# Patient Record
Sex: FEMALE | Race: WHITE | NOT HISPANIC OR LATINO | Employment: OTHER | URBAN - METROPOLITAN AREA
[De-identification: names, ages, dates, MRNs, and addresses within clinical notes are randomized per-mention and may not be internally consistent; named-entity substitution may affect disease eponyms.]

---

## 2019-04-24 ENCOUNTER — TRANSCRIBE ORDERS (OUTPATIENT)
Dept: ADMINISTRATIVE | Facility: HOSPITAL | Age: 64
End: 2019-04-24

## 2019-04-24 ENCOUNTER — APPOINTMENT (OUTPATIENT)
Dept: LAB | Facility: MEDICAL CENTER | Age: 64
End: 2019-04-24
Payer: COMMERCIAL

## 2019-04-24 DIAGNOSIS — Z79.4 TYPE 2 DIABETES MELLITUS WITHOUT COMPLICATION, WITH LONG-TERM CURRENT USE OF INSULIN (HCC): Primary | ICD-10-CM

## 2019-04-24 DIAGNOSIS — E11.9 TYPE 2 DIABETES MELLITUS WITHOUT COMPLICATION, WITH LONG-TERM CURRENT USE OF INSULIN (HCC): Primary | ICD-10-CM

## 2019-04-24 LAB
ALBUMIN SERPL BCP-MCNC: 3.8 G/DL (ref 3.5–5)
ALP SERPL-CCNC: 52 U/L (ref 46–116)
ALT SERPL W P-5'-P-CCNC: 15 U/L (ref 12–78)
ANION GAP SERPL CALCULATED.3IONS-SCNC: 6 MMOL/L (ref 4–13)
AST SERPL W P-5'-P-CCNC: 15 U/L (ref 5–45)
BILIRUB SERPL-MCNC: 0.75 MG/DL (ref 0.2–1)
BUN SERPL-MCNC: 19 MG/DL (ref 5–25)
CALCIUM SERPL-MCNC: 9 MG/DL (ref 8.3–10.1)
CHLORIDE SERPL-SCNC: 107 MMOL/L (ref 100–108)
CHOLEST SERPL-MCNC: 120 MG/DL (ref 50–200)
CO2 SERPL-SCNC: 27 MMOL/L (ref 21–32)
CREAT SERPL-MCNC: 0.74 MG/DL (ref 0.6–1.3)
EST. AVERAGE GLUCOSE BLD GHB EST-MCNC: 174 MG/DL
GFR SERPL CREATININE-BSD FRML MDRD: 86 ML/MIN/1.73SQ M
GLUCOSE P FAST SERPL-MCNC: 98 MG/DL (ref 65–99)
HBA1C MFR BLD: 7.7 % (ref 4.2–6.3)
HDLC SERPL-MCNC: 41 MG/DL (ref 40–60)
LDLC SERPL CALC-MCNC: 60 MG/DL (ref 0–100)
NONHDLC SERPL-MCNC: 79 MG/DL
POTASSIUM SERPL-SCNC: 4.8 MMOL/L (ref 3.5–5.3)
PROT SERPL-MCNC: 6.9 G/DL (ref 6.4–8.2)
SODIUM SERPL-SCNC: 140 MMOL/L (ref 136–145)
TRIGL SERPL-MCNC: 95 MG/DL

## 2019-04-24 PROCEDURE — 80053 COMPREHEN METABOLIC PANEL: CPT | Performed by: FAMILY MEDICINE

## 2019-04-24 PROCEDURE — 82570 ASSAY OF URINE CREATININE: CPT | Performed by: FAMILY MEDICINE

## 2019-04-24 PROCEDURE — 83036 HEMOGLOBIN GLYCOSYLATED A1C: CPT | Performed by: FAMILY MEDICINE

## 2019-04-24 PROCEDURE — 82043 UR ALBUMIN QUANTITATIVE: CPT | Performed by: FAMILY MEDICINE

## 2019-04-24 PROCEDURE — 36415 COLL VENOUS BLD VENIPUNCTURE: CPT | Performed by: FAMILY MEDICINE

## 2019-04-24 PROCEDURE — 80061 LIPID PANEL: CPT | Performed by: FAMILY MEDICINE

## 2019-04-25 LAB
CREAT UR-MCNC: 197 MG/DL
MICROALBUMIN UR-MCNC: 48.3 MG/L (ref 0–20)
MICROALBUMIN/CREAT 24H UR: 25 MG/G CREATININE (ref 0–30)

## 2019-07-26 ENCOUNTER — APPOINTMENT (OUTPATIENT)
Dept: LAB | Facility: MEDICAL CENTER | Age: 64
End: 2019-07-26
Payer: COMMERCIAL

## 2019-07-26 ENCOUNTER — TRANSCRIBE ORDERS (OUTPATIENT)
Dept: ADMINISTRATIVE | Facility: HOSPITAL | Age: 64
End: 2019-07-26

## 2019-07-26 DIAGNOSIS — E11.9 TYPE 2 DIABETES MELLITUS WITHOUT COMPLICATION, WITHOUT LONG-TERM CURRENT USE OF INSULIN (HCC): Primary | ICD-10-CM

## 2019-07-26 LAB
ANION GAP SERPL CALCULATED.3IONS-SCNC: 6 MMOL/L (ref 4–13)
BUN SERPL-MCNC: 24 MG/DL (ref 5–25)
CALCIUM SERPL-MCNC: 9.3 MG/DL (ref 8.3–10.1)
CHLORIDE SERPL-SCNC: 105 MMOL/L (ref 100–108)
CO2 SERPL-SCNC: 27 MMOL/L (ref 21–32)
CREAT SERPL-MCNC: 0.83 MG/DL (ref 0.6–1.3)
EST. AVERAGE GLUCOSE BLD GHB EST-MCNC: 157 MG/DL
GFR SERPL CREATININE-BSD FRML MDRD: 75 ML/MIN/1.73SQ M
GLUCOSE P FAST SERPL-MCNC: 109 MG/DL (ref 65–99)
HBA1C MFR BLD: 7.1 % (ref 4.2–6.3)
POTASSIUM SERPL-SCNC: 4.8 MMOL/L (ref 3.5–5.3)
SODIUM SERPL-SCNC: 138 MMOL/L (ref 136–145)

## 2019-07-26 PROCEDURE — 80048 BASIC METABOLIC PNL TOTAL CA: CPT | Performed by: FAMILY MEDICINE

## 2019-07-26 PROCEDURE — 83036 HEMOGLOBIN GLYCOSYLATED A1C: CPT | Performed by: FAMILY MEDICINE

## 2019-07-26 PROCEDURE — 36415 COLL VENOUS BLD VENIPUNCTURE: CPT | Performed by: FAMILY MEDICINE

## 2019-12-11 ENCOUNTER — APPOINTMENT (OUTPATIENT)
Dept: RADIOLOGY | Facility: MEDICAL CENTER | Age: 64
End: 2019-12-11
Payer: COMMERCIAL

## 2019-12-11 ENCOUNTER — TRANSCRIBE ORDERS (OUTPATIENT)
Dept: ADMINISTRATIVE | Facility: HOSPITAL | Age: 64
End: 2019-12-11

## 2019-12-11 DIAGNOSIS — M54.16 LUMBAR RADICULOPATHY: Primary | ICD-10-CM

## 2019-12-11 DIAGNOSIS — M54.16 LUMBAR RADICULOPATHY: ICD-10-CM

## 2019-12-11 PROCEDURE — 73564 X-RAY EXAM KNEE 4 OR MORE: CPT

## 2019-12-11 PROCEDURE — 72110 X-RAY EXAM L-2 SPINE 4/>VWS: CPT

## 2020-01-09 ENCOUNTER — EVALUATION (OUTPATIENT)
Dept: PHYSICAL THERAPY | Facility: MEDICAL CENTER | Age: 65
End: 2020-01-09
Payer: COMMERCIAL

## 2020-01-09 ENCOUNTER — TRANSCRIBE ORDERS (OUTPATIENT)
Dept: PHYSICAL THERAPY | Facility: MEDICAL CENTER | Age: 65
End: 2020-01-09

## 2020-01-09 DIAGNOSIS — M54.50 CHRONIC RIGHT-SIDED LOW BACK PAIN, UNSPECIFIED WHETHER SCIATICA PRESENT: ICD-10-CM

## 2020-01-09 DIAGNOSIS — G89.29 CHRONIC PAIN OF LEFT KNEE: ICD-10-CM

## 2020-01-09 DIAGNOSIS — M54.50 CHRONIC RIGHT-SIDED LOW BACK PAIN, UNSPECIFIED WHETHER SCIATICA PRESENT: Primary | ICD-10-CM

## 2020-01-09 DIAGNOSIS — G89.29 CHRONIC PAIN OF LEFT KNEE: Primary | ICD-10-CM

## 2020-01-09 DIAGNOSIS — M25.562 CHRONIC PAIN OF LEFT KNEE: ICD-10-CM

## 2020-01-09 DIAGNOSIS — G89.29 CHRONIC RIGHT-SIDED LOW BACK PAIN, UNSPECIFIED WHETHER SCIATICA PRESENT: Primary | ICD-10-CM

## 2020-01-09 DIAGNOSIS — G89.29 CHRONIC RIGHT-SIDED LOW BACK PAIN, UNSPECIFIED WHETHER SCIATICA PRESENT: ICD-10-CM

## 2020-01-09 DIAGNOSIS — M25.562 CHRONIC PAIN OF LEFT KNEE: Primary | ICD-10-CM

## 2020-01-09 PROCEDURE — 97110 THERAPEUTIC EXERCISES: CPT | Performed by: PHYSICAL THERAPIST

## 2020-01-09 PROCEDURE — 97162 PT EVAL MOD COMPLEX 30 MIN: CPT | Performed by: PHYSICAL THERAPIST

## 2020-01-09 NOTE — PROGRESS NOTES
PT Evaluation     Today's date: 2020  Patient name: Rosalinda Holley  : 1955  MRN: [id number]  Referring provider: Myla Steen, *  Dx:   Encounter Diagnosis     ICD-10-CM    1  Chronic right-sided low back pain, unspecified whether sciatica present M54 5     G89 29    2  Chronic pain of left knee M25 562     G89 29                   Assessment  Assessment details: Patient is a 60 y/o female who presents with complaints of right sided lumbar pain that can radiate down the lateral side of the LLE as well as left knee pain  No further referral appears necessary at this time based upon examination results  Patient presents with the following impairments: decreased range of motion, decreased strength and decreased ability to perform functional tasks such as ambulation  Prognosis is good given HEP compliance and PT 2x/wk tapering to 1x/wk over the next 4-6 weeks  Please contact me if you have any questions or recommendations  Thank you for the opportunity to share in Aida's care  Impairments: abnormal gait, abnormal or restricted ROM, activity intolerance, impaired physical strength, lacks appropriate home exercise program, pain with function and weight-bearing intolerance  Understanding of Dx/Px/POC: good   Prognosis: good    Goals  STG  Decrease pain by 50% in 4 weeks  Increase range of motion by 10 degrees in 4 weeks  Increase strength by half a grade in 4 weeks  LTG  Patient will be independent in hep in 4 weeks  Patient will be able to perform adls at plof by D/C  Patient will be able to perform ambulation at plof by D/C      Plan  Patient would benefit from: skilled physical therapy  Referral necessary: No  Planned modality interventions: cryotherapy and thermotherapy: hydrocollator packs  Planned therapy interventions: abdominal trunk stabilization, manual therapy, massage, neuromuscular re-education, home exercise program, functional ROM exercises, flexibility, strengthening, stretching, therapeutic activities, therapeutic exercise, patient education and balance  Frequency: 2x week  Duration in weeks: 6  Plan of Care beginning date: 2020  Plan of Care expiration date: 2020  Treatment plan discussed with: patient        Subjective Evaluation    History of Present Illness  Mechanism of injury: Patient reports just moving here about a year ago  Retired from [employer]  Patient started walking but in the last four months, she started limping  She noted some pain in her lower leg  Patient notes a trigger point in the right buttock and the pain radiates down the posterior-lateral side of right leg  Notes she typically sleeps in the recliner but she slept on her stomach in bed  This seemed to really aggravate her symptoms  Meloxicam started at end of December which has helped  Quality of life: good    Pain  Current pain ratin  At best pain rating: 3  At worst pain rating: 10  Quality: dull ache and radiating  Relieving factors: medications  Aggravating factors: walking  Progression: improved      Diagnostic Tests  X-ray: abnormal  Treatments  Previous treatment: medication  Patient Goals  Patient goals for therapy: decreased pain, return to sport/leisure activities, increased strength and increased motion  Patient goal: walk normally        Objective     Palpation   Left   Muscle spasm in the erector spinae and gluteus medius  Tenderness of the erector spinae, gluteus bibi, gluteus medius and quadratus lumborum       Active Range of Motion     Lumbar   Flexion:  Restriction level: moderate  Extension:  Restriction level: maximal  Left lateral flexion:  with pain Restriction level: moderate  Right lateral flexion:  Restriction level: moderate  Left rotation:  Restriction level: moderate  Right rotation:  Restriction level: moderate  Left Knee   Flexion: 105 degrees   Extension: 0 degrees     Right Knee   Flexion: 110 degrees   Extension: 0 degrees     Passive Range of Motion   Left Knee   Flexion: 120 degrees     Right Knee   Flexion: 120 degrees     Strength/Myotome Testing     Left Hip   Planes of Motion   Flexion: 3+  Extension: 4  Abduction: 4-  Adduction: 4    Right Hip   Planes of Motion   Flexion: 4  Extension: 4  Abduction: 4  Adduction: 4    Left Knee   Flexion: 4-  Extension: 4-    Right Knee   Normal strength    Left Ankle/Foot   Normal strength    Right Ankle/Foot   Normal strength    Additional Strength Details  Abdominals 4-/5    Ambulation     Observational Gait   Gait: antalgic   Decreased walking speed, stride length and left stance time                   Precautions h/o disc herniation, h/o of casted knee (fell on knee), arthritis, DM, gastrointestinal disease, HTN       Manual         STM        piriformis str        ITB stretch                            Exercise Diary  1/9       bike        SKTC 10" 5x ea       PPT with hold 5" 10x       TA hip add iso        TA hip abd iso        TA marches        SAQ        LAQ 5" 10x ea               Sit to stands        Side stepping                                                                                    Modalities        prn

## 2020-01-13 ENCOUNTER — OFFICE VISIT (OUTPATIENT)
Dept: PHYSICAL THERAPY | Facility: MEDICAL CENTER | Age: 65
End: 2020-01-13
Payer: COMMERCIAL

## 2020-01-13 DIAGNOSIS — G89.29 CHRONIC RIGHT-SIDED LOW BACK PAIN, UNSPECIFIED WHETHER SCIATICA PRESENT: Primary | ICD-10-CM

## 2020-01-13 DIAGNOSIS — M25.562 CHRONIC PAIN OF LEFT KNEE: ICD-10-CM

## 2020-01-13 DIAGNOSIS — M54.50 CHRONIC RIGHT-SIDED LOW BACK PAIN, UNSPECIFIED WHETHER SCIATICA PRESENT: Primary | ICD-10-CM

## 2020-01-13 DIAGNOSIS — G89.29 CHRONIC PAIN OF LEFT KNEE: ICD-10-CM

## 2020-01-13 PROCEDURE — 97112 NEUROMUSCULAR REEDUCATION: CPT | Performed by: PHYSICAL THERAPIST

## 2020-01-13 PROCEDURE — 97110 THERAPEUTIC EXERCISES: CPT | Performed by: PHYSICAL THERAPIST

## 2020-01-13 NOTE — PROGRESS NOTES
Daily Note     Today's date: 2020  Patient name: Rosalinda Holley  : 1955  MRN: [id number]  Referring provider: Myla Steen, *  Dx:   Encounter Diagnosis     ICD-10-CM    1  Chronic right-sided low back pain, unspecified whether sciatica present M54 5     G89 29    2  Chronic pain of left knee M25 562     G89 29                   Subjective: Pt without new complaint, but did notice it was easier for her to complete reciprocal stair climbing this weekend  Objective: See treatment diary below      Assessment: pt with fair tolerance to prescribed program as outlined below although pt fatiguing quickly and unable to complete exs as expected, pt requesting to hold on bike this session  Patient demonstrated fatigue post treatment, exhibited good technique with therapeutic exercises and would benefit from continued PT      Plan: Continue per plan of care  Progress treatment as tolerated          Precautions h/o disc herniation, h/o of casted knee (fell on knee), arthritis, DM, gastrointestinal disease, HTN       Manual         STM        piriformis str        ITB stretch                            Exercise Diary        bike  Too fatigued, nv      SKTC 10" 5x ea 10" 5x ea      PPT with hold 5" 10x 5" 10x2      TA hip add iso  5" 2x10      TA hip abd iso  5" 2x10 green      TA marches  x10 B      SAQ  x10 B      LAQ 5" 10x ea 5" x 10 B              Sit to stands  nv      Side stepping  nv                                                                                  Modalities        prn

## 2020-01-15 ENCOUNTER — OFFICE VISIT (OUTPATIENT)
Dept: PHYSICAL THERAPY | Facility: MEDICAL CENTER | Age: 65
End: 2020-01-15
Payer: COMMERCIAL

## 2020-01-15 DIAGNOSIS — M25.562 CHRONIC PAIN OF LEFT KNEE: ICD-10-CM

## 2020-01-15 DIAGNOSIS — G89.29 CHRONIC RIGHT-SIDED LOW BACK PAIN, UNSPECIFIED WHETHER SCIATICA PRESENT: Primary | ICD-10-CM

## 2020-01-15 DIAGNOSIS — G89.29 CHRONIC PAIN OF LEFT KNEE: ICD-10-CM

## 2020-01-15 DIAGNOSIS — M54.50 CHRONIC RIGHT-SIDED LOW BACK PAIN, UNSPECIFIED WHETHER SCIATICA PRESENT: Primary | ICD-10-CM

## 2020-01-15 PROCEDURE — 97110 THERAPEUTIC EXERCISES: CPT | Performed by: PHYSICAL THERAPIST

## 2020-01-15 PROCEDURE — 97112 NEUROMUSCULAR REEDUCATION: CPT | Performed by: PHYSICAL THERAPIST

## 2020-01-15 PROCEDURE — 97140 MANUAL THERAPY 1/> REGIONS: CPT | Performed by: PHYSICAL THERAPIST

## 2020-01-15 NOTE — PROGRESS NOTES
Daily Note     Today's date: 1/15/2020  Patient name: Mariola Baez  : 1955  MRN: [id number]  Referring provider: Zeke Han, *  Dx:   Encounter Diagnosis     ICD-10-CM    1  Chronic right-sided low back pain, unspecified whether sciatica present M54 5     G89 29    2  Chronic pain of left knee M25 562     G89 29                   Subjective: Pt offers no new complaints  Objective: See treatment diary below      Assessment: Pt with fair tolerance to TE  Patient demonstrated fatigue post treatment, exhibited good technique with therapeutic exercises and would benefit from continued PT  Patient tired at end of session but able to perform bike this session  Plan: Continue per plan of care  Progress treatment as tolerated          Precautions h/o disc herniation, h/o of casted knee (fell on knee), arthritis, DM, gastrointestinal disease, HTN       Manual    1/15     STM        piriformis str B   AK     ITB stretch B   AK                         Exercise Diary  1/9 1/13 1/15     bike  Too fatigued, nv 10 min     SKTC 10" 5x ea 10" 5x ea 10" 5x ea     PPT with hold 5" 10x 5" 10x2 5" 20x     TA hip add iso  5" 2x10 5" 20x     TA hip abd iso  5" 2x10 green 5" 20x GTB     TA marches  x10 B x10 ea     SAQ  x10 B 3" 10x     LAQ 5" 10x ea 5" x 10 B 5" 10x B             Sit to stands  nv nv     Side stepping  nv nv                                                                                 Modalities        prn

## 2020-01-22 ENCOUNTER — OFFICE VISIT (OUTPATIENT)
Dept: PHYSICAL THERAPY | Facility: MEDICAL CENTER | Age: 65
End: 2020-01-22
Payer: COMMERCIAL

## 2020-01-22 DIAGNOSIS — G89.29 CHRONIC PAIN OF LEFT KNEE: ICD-10-CM

## 2020-01-22 DIAGNOSIS — G89.29 CHRONIC RIGHT-SIDED LOW BACK PAIN, UNSPECIFIED WHETHER SCIATICA PRESENT: Primary | ICD-10-CM

## 2020-01-22 DIAGNOSIS — M54.50 CHRONIC RIGHT-SIDED LOW BACK PAIN, UNSPECIFIED WHETHER SCIATICA PRESENT: Primary | ICD-10-CM

## 2020-01-22 DIAGNOSIS — M25.562 CHRONIC PAIN OF LEFT KNEE: ICD-10-CM

## 2020-01-22 PROCEDURE — 97140 MANUAL THERAPY 1/> REGIONS: CPT | Performed by: PHYSICAL THERAPIST

## 2020-01-22 PROCEDURE — 97112 NEUROMUSCULAR REEDUCATION: CPT | Performed by: PHYSICAL THERAPIST

## 2020-01-22 PROCEDURE — 97110 THERAPEUTIC EXERCISES: CPT | Performed by: PHYSICAL THERAPIST

## 2020-01-22 NOTE — PROGRESS NOTES
Daily Note     Today's date: 2020  Patient name: Roel Chew  : 1955  MRN: [id number]  Referring provider: Carlito Cox, *  Dx:   Encounter Diagnosis     ICD-10-CM    1  Chronic right-sided low back pain, unspecified whether sciatica present M54 5     G89 29    2  Chronic pain of left knee M25 562     G89 29                   Subjective: Pt reports exercises are going well and that they are helping back and knee  Notes she is able to climb stairs step over step  Objective: See treatment diary below      Assessment: Pt with good tolerance to TE  Patient demonstrated fatigue post treatment, exhibited good technique with therapeutic exercises and would benefit from continued PT  Able to progress repetitions of exercises today  Also added sidestepping  No complaints of increased pain  Plan: Continue per plan of care  Progress treatment as tolerated          Precautions h/o disc herniation, h/o of casted knee (fell on knee), arthritis, DM, gastrointestinal disease, HTN       Manual    1/15 1/22    STM        piriformis str B   AK AK    ITB stretch B   AK AK                        Exercise Diary  1/9 1/13 1/15 1/22    bike  Too fatigued, nv 10 min 10 min    SKTC 10" 5x ea 10" 5x ea 10" 5x ea 10" 5x ea    PPT with hold 5" 10x 5" 10x2 5" 20x 5" 20x    TA hip add iso  5" 2x10 5" 20x 5" 20x    TA hip abd iso  5" 2x10 green 5" 20x GTB 5" 20x GTB    TA marches  x10 B x10 ea 15x ea    SAQ  x10 B 3" 10x 5" 20x    LAQ 5" 10x ea 5" x 10 B 5" 10x B 5" 20x ea            Sit to stands  nv nv NV    Side stepping  nv nv 5 laps                                                                                Modalities        prn

## 2020-01-24 ENCOUNTER — OFFICE VISIT (OUTPATIENT)
Dept: PHYSICAL THERAPY | Facility: MEDICAL CENTER | Age: 65
End: 2020-01-24
Payer: COMMERCIAL

## 2020-01-24 DIAGNOSIS — M25.562 CHRONIC PAIN OF LEFT KNEE: ICD-10-CM

## 2020-01-24 DIAGNOSIS — G89.29 CHRONIC PAIN OF LEFT KNEE: ICD-10-CM

## 2020-01-24 DIAGNOSIS — G89.29 CHRONIC RIGHT-SIDED LOW BACK PAIN, UNSPECIFIED WHETHER SCIATICA PRESENT: Primary | ICD-10-CM

## 2020-01-24 DIAGNOSIS — M54.50 CHRONIC RIGHT-SIDED LOW BACK PAIN, UNSPECIFIED WHETHER SCIATICA PRESENT: Primary | ICD-10-CM

## 2020-01-24 PROCEDURE — 97140 MANUAL THERAPY 1/> REGIONS: CPT | Performed by: PHYSICAL MEDICINE & REHABILITATION

## 2020-01-24 PROCEDURE — 97112 NEUROMUSCULAR REEDUCATION: CPT | Performed by: PHYSICAL MEDICINE & REHABILITATION

## 2020-01-24 PROCEDURE — 97110 THERAPEUTIC EXERCISES: CPT | Performed by: PHYSICAL MEDICINE & REHABILITATION

## 2020-01-24 NOTE — PROGRESS NOTES
Daily Note     Today's date: 2020  Patient name: Jayne Mota  : 1955  MRN: [id number]  Referring provider: Nery Min, *  Dx:   Encounter Diagnosis     ICD-10-CM    1  Chronic right-sided low back pain, unspecified whether sciatica present M54 5     G89 29    2  Chronic pain of left knee M25 562     G89 29                   Subjective: Patient offers no new complaints  Objective: See treatment diary below    Assessment: Pt with good tolerance to TE as charted  Held some TE today secondary to patient communicating that she failed to eat breakfast this morning but did take her insulin and was feeling the effects of this  Patient defers food or drink  Resume nv  Patient demonstrated fatigue post treatment, exhibited good technique with therapeutic exercises and would benefit from continued PT  Plan: Continue per plan of care  Progress treatment as tolerated          Precautions h/o disc herniation, h/o of casted knee (fell on knee), arthritis, DM, gastrointestinal disease, HTN       Manual    1/15 1/22 1/24   STM        piriformis str B   AK AK LH   ITB stretch B   AK AK [facility]                       Exercise Diary  1/9 1/13 1/15 1/22 1/24   bike  Too fatigued, nv 10 min 10 min 10'   [address] 10" 5x ea 10" 5x ea 10" 5x ea 10" 5x ea 5x10"   PPT with hold 5" 10x 5" 10x2 5" 20x 5" 20x 20x5"   TA hip add iso  5" 2x10 5" 20x 5" 20x 20x5"   TA hip abd iso  5" 2x10 green 5" 20x GTB 5" 20x GTB GTB 20x5"   TA marches  x10 B x10 ea 15x ea 15x ea   SAQ  x10 B 3" 10x 5" 20x 20x5"   LAQ 5" 10x ea 5" x 10 B 5" 10x B 5" 20x ea np           Sit to stands  nv nv NV np   Side stepping  nv nv 5 laps np                                                                               Modalities        prn

## 2020-01-27 ENCOUNTER — OFFICE VISIT (OUTPATIENT)
Dept: PHYSICAL THERAPY | Facility: MEDICAL CENTER | Age: 65
End: 2020-01-27
Payer: COMMERCIAL

## 2020-01-27 DIAGNOSIS — M54.50 CHRONIC RIGHT-SIDED LOW BACK PAIN, UNSPECIFIED WHETHER SCIATICA PRESENT: Primary | ICD-10-CM

## 2020-01-27 DIAGNOSIS — G89.29 CHRONIC RIGHT-SIDED LOW BACK PAIN, UNSPECIFIED WHETHER SCIATICA PRESENT: Primary | ICD-10-CM

## 2020-01-27 DIAGNOSIS — M25.562 CHRONIC PAIN OF LEFT KNEE: ICD-10-CM

## 2020-01-27 DIAGNOSIS — G89.29 CHRONIC PAIN OF LEFT KNEE: ICD-10-CM

## 2020-01-27 PROCEDURE — 97140 MANUAL THERAPY 1/> REGIONS: CPT | Performed by: PHYSICAL THERAPIST

## 2020-01-27 PROCEDURE — 97110 THERAPEUTIC EXERCISES: CPT | Performed by: PHYSICAL THERAPIST

## 2020-01-27 PROCEDURE — 97112 NEUROMUSCULAR REEDUCATION: CPT | Performed by: PHYSICAL THERAPIST

## 2020-01-27 NOTE — PROGRESS NOTES
Daily Note     Today's date: 2020  Patient name: Milena Michel  : 1955  MRN: [id number]  Referring provider: Connor Pressley, *  Dx:   Encounter Diagnosis     ICD-10-CM    1  Chronic right-sided low back pain, unspecified whether sciatica present M54 5     G89 29    2  Chronic pain of left knee M25 562     G89 29                   Subjective: Patient offers no new complaints  Objective: See treatment diary below    Assessment: Pt with good tolerance to exercise program  Able to resume full POC today  Patient demonstrated fatigue post treatment, exhibited good technique with therapeutic exercises and would benefit from continued PT  Plan: Continue per plan of care  Progress treatment as tolerated          Precautions h/o disc herniation, h/o of casted knee (fell on knee), arthritis, DM, gastrointestinal disease, HTN       Manual         STM        piriformis str B AK       ITB stretch B AK                           Exercise Diary         bike 6'       [address] 10" 5x ea       PPT with hold 5" 20x       TA hip add iso 5" 20x       TA hip abd iso GTB 5" 20x       TA marches 15x ea       SAQ 5" 20x 1 5#       LAQ 5" 20x ea 1 5#               Sit to stands 2x10       Side stepping 5 laps                                                                                   Modalities        prn

## 2020-01-28 ENCOUNTER — APPOINTMENT (OUTPATIENT)
Dept: LAB | Facility: MEDICAL CENTER | Age: 65
End: 2020-01-28
Payer: COMMERCIAL

## 2020-01-28 ENCOUNTER — TRANSCRIBE ORDERS (OUTPATIENT)
Dept: ADMINISTRATIVE | Facility: HOSPITAL | Age: 65
End: 2020-01-28

## 2020-01-28 DIAGNOSIS — E11.9 TYPE 2 DIABETES MELLITUS WITHOUT COMPLICATION, WITHOUT LONG-TERM CURRENT USE OF INSULIN (HCC): ICD-10-CM

## 2020-01-28 DIAGNOSIS — E78.2 MIXED HYPERLIPIDEMIA: Primary | ICD-10-CM

## 2020-01-28 LAB
CHOLEST SERPL-MCNC: 122 MG/DL (ref 50–200)
CREAT UR-MCNC: 217 MG/DL
EST. AVERAGE GLUCOSE BLD GHB EST-MCNC: 157 MG/DL
HBA1C MFR BLD: 7.1 % (ref 4.2–6.3)
HDLC SERPL-MCNC: 34 MG/DL
LDLC SERPL CALC-MCNC: 67 MG/DL (ref 0–100)
MICROALBUMIN UR-MCNC: 45.8 MG/L (ref 0–20)
MICROALBUMIN/CREAT 24H UR: 21 MG/G CREATININE (ref 0–30)
NONHDLC SERPL-MCNC: 88 MG/DL
TRIGL SERPL-MCNC: 106 MG/DL

## 2020-01-28 PROCEDURE — 82570 ASSAY OF URINE CREATININE: CPT | Performed by: FAMILY MEDICINE

## 2020-01-28 PROCEDURE — 83036 HEMOGLOBIN GLYCOSYLATED A1C: CPT | Performed by: FAMILY MEDICINE

## 2020-01-28 PROCEDURE — 82043 UR ALBUMIN QUANTITATIVE: CPT | Performed by: FAMILY MEDICINE

## 2020-01-28 PROCEDURE — 36415 COLL VENOUS BLD VENIPUNCTURE: CPT | Performed by: FAMILY MEDICINE

## 2020-01-28 PROCEDURE — 80061 LIPID PANEL: CPT | Performed by: FAMILY MEDICINE

## 2020-01-31 ENCOUNTER — OFFICE VISIT (OUTPATIENT)
Dept: PHYSICAL THERAPY | Facility: MEDICAL CENTER | Age: 65
End: 2020-01-31
Payer: COMMERCIAL

## 2020-01-31 DIAGNOSIS — M25.562 CHRONIC PAIN OF LEFT KNEE: ICD-10-CM

## 2020-01-31 DIAGNOSIS — M54.50 CHRONIC RIGHT-SIDED LOW BACK PAIN, UNSPECIFIED WHETHER SCIATICA PRESENT: Primary | ICD-10-CM

## 2020-01-31 DIAGNOSIS — G89.29 CHRONIC RIGHT-SIDED LOW BACK PAIN, UNSPECIFIED WHETHER SCIATICA PRESENT: Primary | ICD-10-CM

## 2020-01-31 DIAGNOSIS — G89.29 CHRONIC PAIN OF LEFT KNEE: ICD-10-CM

## 2020-01-31 PROCEDURE — 97112 NEUROMUSCULAR REEDUCATION: CPT | Performed by: PHYSICAL THERAPIST

## 2020-01-31 PROCEDURE — 97140 MANUAL THERAPY 1/> REGIONS: CPT | Performed by: PHYSICAL THERAPIST

## 2020-01-31 PROCEDURE — 97110 THERAPEUTIC EXERCISES: CPT | Performed by: PHYSICAL THERAPIST

## 2020-01-31 NOTE — PROGRESS NOTES
Daily Note     Today's date: 2020  Patient name: Sherrie Nash  : 1955  MRN: [id number]  Referring provider: Kyle Mak, *  Dx:   Encounter Diagnosis     ICD-10-CM    1  Chronic right-sided low back pain, unspecified whether sciatica present M54 5     G89 29    2  Chronic pain of left knee M25 562     G89 29                   Subjective: Patient reports sore today in back and knees  Objective: See treatment diary below    Assessment: Pt with good tolerance to POC  Patient demonstrated fatigue post treatment, exhibited good technique with therapeutic exercises and would benefit from continued PT  No increase in pain throughout session  Plan: Continue per plan of care  Progress treatment as tolerated          Precautions h/o disc herniation, h/o of casted knee (fell on knee), arthritis, DM, gastrointestinal disease, HTN       Manual        STM        piriformis str B AK AK      ITB stretch B AK AK                          Exercise Diary        bike 6' 7 min      SKTC 10" 5x ea 10" 5x ea      PPT with hold 5" 20x 5" 20x      TA hip add iso 5" 20x 5" 20x      TA hip abd iso GTB 5" 20x GTB 5" 20x      TA marches 15x ea 15x ea      SAQ 5" 20x 1 5# 5" 20x 1 5#      LAQ 5" 20x ea 1 5# 5" 20x ea 1 5#              Sit to stands 2x10 2x10      Side stepping 5 laps 5 laps                                                                                  Modalities        prn

## 2020-02-03 ENCOUNTER — APPOINTMENT (OUTPATIENT)
Dept: PHYSICAL THERAPY | Facility: MEDICAL CENTER | Age: 65
End: 2020-02-03
Payer: COMMERCIAL

## 2020-02-06 ENCOUNTER — EVALUATION (OUTPATIENT)
Dept: PHYSICAL THERAPY | Facility: MEDICAL CENTER | Age: 65
End: 2020-02-06
Payer: COMMERCIAL

## 2020-02-06 ENCOUNTER — TRANSCRIBE ORDERS (OUTPATIENT)
Dept: PHYSICAL THERAPY | Facility: MEDICAL CENTER | Age: 65
End: 2020-02-06

## 2020-02-06 DIAGNOSIS — G89.29 CHRONIC RIGHT-SIDED LOW BACK PAIN, UNSPECIFIED WHETHER SCIATICA PRESENT: Primary | ICD-10-CM

## 2020-02-06 DIAGNOSIS — M54.50 CHRONIC RIGHT-SIDED LOW BACK PAIN, UNSPECIFIED WHETHER SCIATICA PRESENT: Primary | ICD-10-CM

## 2020-02-06 DIAGNOSIS — G89.29 CHRONIC PAIN OF LEFT KNEE: ICD-10-CM

## 2020-02-06 DIAGNOSIS — M25.562 CHRONIC PAIN OF LEFT KNEE: ICD-10-CM

## 2020-02-06 PROCEDURE — 97112 NEUROMUSCULAR REEDUCATION: CPT | Performed by: PHYSICAL THERAPIST

## 2020-02-06 PROCEDURE — 97110 THERAPEUTIC EXERCISES: CPT | Performed by: PHYSICAL THERAPIST

## 2020-02-06 NOTE — PROGRESS NOTES
PT Re-Evaluation     Today's date: 2020  Patient name: Valentina Lugo  : 1955  MRN: [id number]  Referring provider: Brit Schwarz, *  Dx:   Encounter Diagnosis     ICD-10-CM    1  Chronic right-sided low back pain, unspecified whether sciatica present M54 5     G89 29    2  Chronic pain of left knee M25 562     G89 29                   Assessment  Assessment details: Patient has made progress towards short and long term goals since beginning physical therapy  Patient has decreased pain, increased range of motion and increased strength  Patient also able to perform functional activities such as ambulation better than initially  Patient will  benefit from continued physical therapy for last two visits in order to provide advanced HEP and maximize function  Impairments: abnormal gait, abnormal or restricted ROM, activity intolerance, impaired physical strength, pain with function and weight-bearing intolerance  Understanding of Dx/Px/POC: good   Prognosis: good    Goals  STG  Decrease pain by 50% in 4 weeks  met  Increase range of motion by 10 degrees in 4 weeks  PM  Increase strength by half a grade in 4 weeks  PM  LTG  Patient will be independent in hep in 4 weeks  met  Patient will be able to perform adls at plof by D/C  PM  Patient will be able to perform ambulation at plof by D/C   PM    Plan  Patient would benefit from: skilled physical therapy  Referral necessary: No  Planned modality interventions: cryotherapy and thermotherapy: hydrocollator packs  Planned therapy interventions: abdominal trunk stabilization, manual therapy, massage, neuromuscular re-education, home exercise program, functional ROM exercises, flexibility, strengthening, stretching, therapeutic activities, therapeutic exercise, patient education and balance  Frequency: 2x week  Duration in weeks: 2  Plan of Care beginning date: 2020  Plan of Care expiration date: 2020  Treatment plan discussed with: patient        Subjective Evaluation    History of Present Illness  Mechanism of injury: Patient presents for reassessment after beginning PT on 2020  Patient reports it is much easier to go up and down the stairs now  Patient also reports being able to walk better as well  She is not limping as much and less pain  Reports she is taking much less pain medicine as well  Quality of life: good    Pain  Current pain ratin  At best pain ratin  At worst pain ratin  Quality: dull ache  Relieving factors: medications  Aggravating factors: walking  Progression: improved      Diagnostic Tests  X-ray: abnormal  Treatments  Previous treatment: medication  Patient Goals  Patient goals for therapy: decreased pain, return to sport/leisure activities, increased strength and increased motion  Patient goal: walk normally        Objective     Palpation   Left   Tenderness of the gluteus bibi and gluteus medius       Additional Palpation Details  Slight tenderness now    Active Range of Motion     Lumbar   Flexion:  Restriction level: minimal  Extension:  Restriction level: moderate  Left lateral flexion:  Restriction level: moderate  Right lateral flexion:  Restriction level: moderate  Left rotation:  Restriction level: minimal  Right rotation:  Restriction level: minimal  Left Knee   Flexion: 110 degrees   Extension: 0 degrees     Right Knee   Flexion: 110 degrees   Extension: 0 degrees     Passive Range of Motion   Left Knee   Flexion: 125 degrees     Right Knee   Flexion: 125 degrees     Strength/Myotome Testing     Left Hip   Planes of Motion   Flexion: 3+  Extension: 4+  Abduction: 4  Adduction: 4+    Right Hip   Planes of Motion   Flexion: 4  Extension: 4+  Abduction: 4+  Adduction: 4+    Left Knee   Flexion: 4  Extension: 4+    Right Knee   Normal strength    Left Ankle/Foot   Normal strength    Right Ankle/Foot   Normal strength    Additional Strength Details  Abdominals 4-/5    Ambulation Observational Gait   Gait: antalgic   Decreased walking speed, stride length and left stance time                   Precautions h/o disc herniation, h/o of casted knee (fell on knee), arthritis, DM, gastrointestinal disease, HTN       Manual  1/27 1/31      STM        piriformis str B AK AK      ITB stretch B AK AK                          Exercise Diary  1/27 1/31 2/6     bike 6' 7 min 7 min     SKTC 10" 5x ea 10" 5x ea 10" 5x ea     PPT with hold 5" 20x 5" 20x 5" 20x     TA hip add iso 5" 20x 5" 20x 5" 20x     TA hip abd iso GTB 5" 20x GTB 5" 20x GTB 5" 20x     TA marches 15x ea 15x ea 15x ea     SAQ 5" 20x 1 5# 5" 20x 1 5# 5" 20x 2#     LAQ 5" 20x ea 1 5# 5" 20x ea 1 5# 5" 20x ea 2#             Sit to stands 2x10 2x10 2x10     Side stepping 5 laps 5 laps 5 laps YTB                                                                                 Modalities        prn

## 2020-02-06 NOTE — LETTER
2020    Ward Taveras DO  [address] [address] [id number]    Patient: Jillian Jackson   YOB: 1955   Date of Visit: 2020     Encounter Diagnosis     ICD-10-CM    1  Chronic right-sided low back pain, unspecified whether sciatica present M54 5     G89 29    2  Chronic pain of left knee M25 562     G89 29        Dear Dr Mayr Hester: Thank you for your recent referral of Jillian Jackson  Please review the attached evaluation summary from Carol's recent visit  Please verify that you agree with the plan of care by signing the attached order  If you have any questions or concerns, please do not hesitate to call  I sincerely appreciate the opportunity to share in the care of one of your patients and hope to have another opportunity to work with you in the near future  Sincerely,    Anaya Islas, PT      Referring Provider:      I certify that I have read the below Plan of Care and certify the need for these services furnished under this plan of treatment while under my care  Ward Taveras DO [address]: [phone]          PT Re-Evaluation     Today's date: 2020  Patient name: Jillian Jackson  : 1955  MRN: [id number]  Referring provider: Jabari Silver, *  Dx:   Encounter Diagnosis     ICD-10-CM    1  Chronic right-sided low back pain, unspecified whether sciatica present M54 5     G89 29    2  Chronic pain of left knee M25 562     G89 29                   Assessment  Assessment details: Patient has made progress towards short and long term goals since beginning physical therapy  Patient has decreased pain, increased range of motion and increased strength  Patient also able to perform functional activities such as ambulation better than initially   Patient will  benefit from continued physical therapy for last two visits in order to provide advanced HEP and maximize function  Impairments: abnormal gait, abnormal or restricted ROM, activity intolerance, impaired physical strength, pain with function and weight-bearing intolerance  Understanding of Dx/Px/POC: good   Prognosis: good    Goals  STG  Decrease pain by 50% in 4 weeks  met  Increase range of motion by 10 degrees in 4 weeks  PM  Increase strength by half a grade in 4 weeks  PM  LTG  Patient will be independent in hep in 4 weeks  met  Patient will be able to perform adls at plof by D/C  PM  Patient will be able to perform ambulation at plof by D/C  PM    Plan  Patient would benefit from: skilled physical therapy  Referral necessary: No  Planned modality interventions: cryotherapy and thermotherapy: hydrocollator packs  Planned therapy interventions: abdominal trunk stabilization, manual therapy, massage, neuromuscular re-education, home exercise program, functional ROM exercises, flexibility, strengthening, stretching, therapeutic activities, therapeutic exercise, patient education and balance  Frequency: 2x week  Duration in weeks: 2  Plan of Care beginning date: 2020  Plan of Care expiration date: 2020  Treatment plan discussed with: patient        Subjective Evaluation    History of Present Illness  Mechanism of injury: Patient presents for reassessment after beginning PT on 2020  Patient reports it is much easier to go up and down the stairs now  Patient also reports being able to walk better as well  She is not limping as much and less pain  Reports she is taking much less pain medicine as well    Quality of life: good    Pain  Current pain ratin  At best pain ratin  At worst pain ratin  Quality: dull ache  Relieving factors: medications  Aggravating factors: walking  Progression: improved      Diagnostic Tests  X-ray: abnormal  Treatments  Previous treatment: medication  Patient Goals  Patient goals for therapy: decreased pain, return to sport/leisure activities, increased strength and increased motion  Patient goal: walk normally        Objective     Palpation   Left   Tenderness of the gluteus bibi and gluteus medius  Additional Palpation Details  Slight tenderness now    Active Range of Motion     Lumbar   Flexion:  Restriction level: minimal  Extension:  Restriction level: moderate  Left lateral flexion:  Restriction level: moderate  Right lateral flexion:  Restriction level: moderate  Left rotation:  Restriction level: minimal  Right rotation:  Restriction level: minimal  Left Knee   Flexion: 110 degrees   Extension: 0 degrees     Right Knee   Flexion: 110 degrees   Extension: 0 degrees     Passive Range of Motion   Left Knee   Flexion: 125 degrees     Right Knee   Flexion: 125 degrees     Strength/Myotome Testing     Left Hip   Planes of Motion   Flexion: 3+  Extension: 4+  Abduction: 4  Adduction: 4+    Right Hip   Planes of Motion   Flexion: 4  Extension: 4+  Abduction: 4+  Adduction: 4+    Left Knee   Flexion: 4  Extension: 4+    Right Knee   Normal strength    Left Ankle/Foot   Normal strength    Right Ankle/Foot   Normal strength    Additional Strength Details  Abdominals 4-/5    Ambulation     Observational Gait   Gait: antalgic   Decreased walking speed, stride length and left stance time                   Precautions h/o disc herniation, h/o of casted knee (fell on knee), arthritis, DM, gastrointestinal disease, HTN       Manual  1/27 1/31      STM        piriformis str B AK AK      ITB stretch B AK AK                          Exercise Diary  1/27 1/31 2/6     bike 6' 7 min 7 min     SKTC 10" 5x ea 10" 5x ea 10" 5x ea     PPT with hold 5" 20x 5" 20x 5" 20x     TA hip add iso 5" 20x 5" 20x 5" 20x     TA hip abd iso GTB 5" 20x GTB 5" 20x GTB 5" 20x     TA marches 15x ea 15x ea 15x ea     SAQ 5" 20x 1 5# 5" 20x 1 5# 5" 20x 2#     LAQ 5" 20x ea 1 5# 5" 20x ea 1 5# 5" 20x ea 2#             Sit to stands 2x10 2x10 2x10     Side stepping 5 laps 5 laps 5 laps YTB                                                                                 Modalities        prn

## 2020-02-10 ENCOUNTER — OFFICE VISIT (OUTPATIENT)
Dept: PHYSICAL THERAPY | Facility: MEDICAL CENTER | Age: 65
End: 2020-02-10
Payer: COMMERCIAL

## 2020-02-10 DIAGNOSIS — G89.29 CHRONIC RIGHT-SIDED LOW BACK PAIN, UNSPECIFIED WHETHER SCIATICA PRESENT: Primary | ICD-10-CM

## 2020-02-10 DIAGNOSIS — M54.50 CHRONIC RIGHT-SIDED LOW BACK PAIN, UNSPECIFIED WHETHER SCIATICA PRESENT: Primary | ICD-10-CM

## 2020-02-10 DIAGNOSIS — G89.29 CHRONIC PAIN OF LEFT KNEE: ICD-10-CM

## 2020-02-10 DIAGNOSIS — M25.562 CHRONIC PAIN OF LEFT KNEE: ICD-10-CM

## 2020-02-10 PROCEDURE — 97112 NEUROMUSCULAR REEDUCATION: CPT | Performed by: PHYSICAL THERAPIST

## 2020-02-10 PROCEDURE — 97110 THERAPEUTIC EXERCISES: CPT | Performed by: PHYSICAL THERAPIST

## 2020-02-13 ENCOUNTER — APPOINTMENT (OUTPATIENT)
Dept: PHYSICAL THERAPY | Facility: MEDICAL CENTER | Age: 65
End: 2020-02-13
Payer: COMMERCIAL

## 2020-02-14 ENCOUNTER — OFFICE VISIT (OUTPATIENT)
Dept: PHYSICAL THERAPY | Facility: MEDICAL CENTER | Age: 65
End: 2020-02-14
Payer: COMMERCIAL

## 2020-02-14 DIAGNOSIS — M25.562 CHRONIC PAIN OF LEFT KNEE: ICD-10-CM

## 2020-02-14 DIAGNOSIS — M54.50 CHRONIC RIGHT-SIDED LOW BACK PAIN, UNSPECIFIED WHETHER SCIATICA PRESENT: Primary | ICD-10-CM

## 2020-02-14 DIAGNOSIS — G89.29 CHRONIC PAIN OF LEFT KNEE: ICD-10-CM

## 2020-02-14 DIAGNOSIS — G89.29 CHRONIC RIGHT-SIDED LOW BACK PAIN, UNSPECIFIED WHETHER SCIATICA PRESENT: Primary | ICD-10-CM

## 2020-02-14 PROCEDURE — 97110 THERAPEUTIC EXERCISES: CPT | Performed by: PHYSICAL THERAPIST

## 2020-02-14 PROCEDURE — 97112 NEUROMUSCULAR REEDUCATION: CPT | Performed by: PHYSICAL THERAPIST

## 2020-02-14 NOTE — PROGRESS NOTES
Daily Note and Discharge    Today's date: 2020  Patient name: Charla Gutierrez  : 1955  MRN: [id number]  Referring provider: Ирина Mcclure, *  Dx:   Encounter Diagnosis     ICD-10-CM    1  Chronic right-sided low back pain, unspecified whether sciatica present M54 5     G89 29    2  Chronic pain of left knee M25 562     G89 29                   Subjective: Patient reports today will be last visit  Feels confident in her ability to continue with HEP on her own  Objective: See treatment diary below      Assessment: Tolerated treatment well  Patient exhibited good technique with therapeutic exercises  D/C to comprehensive HEP  Plan: D/C       Precautions h/o disc herniation, h/o of casted knee (fell on knee), arthritis, DM, gastrointestinal disease, HTN       Manual        STM        piriformis str B AK AK      ITB stretch B AK AK                          Exercise Diary  1/27 1/31 2/6 2/10 2/14   bike 6' 7 min 7 min 7 min 7 min   SKTC 10" 5x ea 10" 5x ea 10" 5x ea 10" 5x ea 10" 5x ea   PPT with hold 5" 20x 5" 20x 5" 20x 5" 20x 5" 20x   TA hip add iso 5" 20x 5" 20x 5" 20x 5" 20x 5" 20x   TA hip abd iso GTB 5" 20x GTB 5" 20x GTB 5" 20x GTB 5" 20x GTB 5"20x   TA marches 15x ea 15x ea 15x ea 20x ea 20x ea   SAQ 5" 20x 1 5# 5" 20x 1 5# 5" 20x 2# 5" 20x 2# 5" 20x 2#   LAQ 5" 20x ea 1 5# 5" 20x ea 1 5# 5" 20x ea 2# 5" 20x ea 2# 5" 20x ea 2#           Sit to stands 2x10 2x10 2x10 2x10 2x10   Side stepping 5 laps 5 laps 5 laps YTB 5 laps ytb 5 laps YTB                                                                               Modalities        prn

## 2020-08-01 ENCOUNTER — APPOINTMENT (OUTPATIENT)
Dept: LAB | Facility: MEDICAL CENTER | Age: 65
End: 2020-08-01
Payer: COMMERCIAL

## 2020-08-01 ENCOUNTER — TRANSCRIBE ORDERS (OUTPATIENT)
Dept: ADMINISTRATIVE | Facility: HOSPITAL | Age: 65
End: 2020-08-01

## 2020-08-01 DIAGNOSIS — E11.9 DIABETES MELLITUS WITHOUT COMPLICATION (HCC): ICD-10-CM

## 2020-08-01 DIAGNOSIS — Z79.4 ENCOUNTER FOR LONG-TERM (CURRENT) USE OF INSULIN (HCC): ICD-10-CM

## 2020-08-01 DIAGNOSIS — E11.9 DIABETES MELLITUS WITHOUT COMPLICATION (HCC): Primary | ICD-10-CM

## 2020-08-01 PROCEDURE — 80048 BASIC METABOLIC PNL TOTAL CA: CPT

## 2020-08-01 PROCEDURE — 83036 HEMOGLOBIN GLYCOSYLATED A1C: CPT

## 2020-08-01 PROCEDURE — 36415 COLL VENOUS BLD VENIPUNCTURE: CPT

## 2020-08-02 LAB
ANION GAP SERPL CALCULATED.3IONS-SCNC: 4 MMOL/L (ref 4–13)
BUN SERPL-MCNC: 26 MG/DL (ref 5–25)
CALCIUM SERPL-MCNC: 10 MG/DL (ref 8.3–10.1)
CHLORIDE SERPL-SCNC: 104 MMOL/L (ref 100–108)
CO2 SERPL-SCNC: 28 MMOL/L (ref 21–32)
CREAT SERPL-MCNC: 0.86 MG/DL (ref 0.6–1.3)
EST. AVERAGE GLUCOSE BLD GHB EST-MCNC: 154 MG/DL
GFR SERPL CREATININE-BSD FRML MDRD: 72 ML/MIN/1.73SQ M
GLUCOSE P FAST SERPL-MCNC: 86 MG/DL (ref 65–99)
HBA1C MFR BLD: 7 %
POTASSIUM SERPL-SCNC: 4.6 MMOL/L (ref 3.5–5.3)
SODIUM SERPL-SCNC: 136 MMOL/L (ref 136–145)

## 2021-02-05 NOTE — LETTER
2020    [facility] [address]     Patient: New Portillo   YOB: 1955   Date of Visit: 2020     Encounter Diagnosis     ICD-10-CM    1  Chronic right-sided low back pain, unspecified whether sciatica present M54 5     G89 29    2  Chronic pain of left knee M25 562     G89 29        Dear Dr Key Letters: Thank you for your recent referral of [place] Portillo  Please review the attached evaluation summary from Carol's recent visit  Please verify that you agree with the plan of care by signing the attached order  If you have any questions or concerns, please do not hesitate to call  I sincerely appreciate the opportunity to share in the care of one of your patients and hope to have another opportunity to work with you in the near future  Sincerely,    Laura Lanier, PT      Referring Provider:      I certify that I have read the below Plan of Care and certify the need for these services furnished under this plan of treatment while under my care  Corona Caban DO  [facility] [address] [address]: [phone]          PT Evaluation     Today's date: 2020  Patient name: New Portillo  : 1955  MRN: [id number]  Referring provider: Geri Blanco, *  Dx:   Encounter Diagnosis     ICD-10-CM    1  Chronic right-sided low back pain, unspecified whether sciatica present M54 5     G89 29    2  Chronic pain of left knee M25 562     G89 29                   Assessment  Assessment details: Patient is a 58 y/o female who presents with complaints of right sided lumbar pain that can radiate down the lateral side of the LLE as well as left knee pain  No further referral appears necessary at this time based upon examination results    Patient presents with the following impairments: decreased range of motion, decreased strength and decreased ability to perform functional tasks such as ambulation  Prognosis is good given HEP compliance and PT 2x/wk tapering to 1x/wk over the next 4-6 weeks  Please contact me if you have any questions or recommendations  Thank you for the opportunity to share in Aida's care  Impairments: abnormal gait, abnormal or restricted ROM, activity intolerance, impaired physical strength, lacks appropriate home exercise program, pain with function and weight-bearing intolerance  Understanding of Dx/Px/POC: good   Prognosis: good    Goals  STG  Decrease pain by 50% in 4 weeks  Increase range of motion by 10 degrees in 4 weeks  Increase strength by half a grade in 4 weeks  LTG  Patient will be independent in hep in 4 weeks  Patient will be able to perform adls at plof by D/C  Patient will be able to perform ambulation at plof by D/C  Plan  Patient would benefit from: skilled physical therapy  Referral necessary: No  Planned modality interventions: cryotherapy and thermotherapy: hydrocollator packs  Planned therapy interventions: abdominal trunk stabilization, manual therapy, massage, neuromuscular re-education, home exercise program, functional ROM exercises, flexibility, strengthening, stretching, therapeutic activities, therapeutic exercise, patient education and balance  Frequency: 2x week  Duration in weeks: 6  Plan of Care beginning date: 1/9/2020  Plan of Care expiration date: 2/20/2020  Treatment plan discussed with: patient        Subjective Evaluation    History of Present Illness  Mechanism of injury: Patient reports just moving here about a year ago  Retired from [email or web address]  Patient started walking but in the last four months, she started limping  She noted some pain in her lower leg  Patient notes a trigger point in the right buttock and the pain radiates down the posterior-lateral side of right leg  Notes she typically sleeps in the recliner but she slept on her stomach in bed   This seemed to really aggravate her symptoms  Meloxicam started at end of December which has helped  Quality of life: good    Pain  Current pain ratin  At best pain rating: 3  At worst pain rating: 10  Quality: dull ache and radiating  Relieving factors: medications  Aggravating factors: walking  Progression: improved      Diagnostic Tests  X-ray: abnormal  Treatments  Previous treatment: medication  Patient Goals  Patient goals for therapy: decreased pain, return to sport/leisure activities, increased strength and increased motion  Patient goal: walk normally        Objective     Palpation   Left   Muscle spasm in the erector spinae and gluteus medius  Tenderness of the erector spinae, gluteus bibi, gluteus medius and quadratus lumborum  Active Range of Motion     Lumbar   Flexion:  Restriction level: moderate  Extension:  Restriction level: maximal  Left lateral flexion:  with pain Restriction level: moderate  Right lateral flexion:  Restriction level: moderate  Left rotation:  Restriction level: moderate  Right rotation:  Restriction level: moderate  Left Knee   Flexion: 105 degrees   Extension: 0 degrees     Right Knee   Flexion: 110 degrees   Extension: 0 degrees     Passive Range of Motion   Left Knee   Flexion: 120 degrees     Right Knee   Flexion: 120 degrees     Strength/Myotome Testing     Left Hip   Planes of Motion   Flexion: 3+  Extension: 4  Abduction: 4-  Adduction: 4    Right Hip   Planes of Motion   Flexion: 4  Extension: 4  Abduction: 4  Adduction: 4    Left Knee   Flexion: 4-  Extension: 4-    Right Knee   Normal strength    Left Ankle/Foot   Normal strength    Right Ankle/Foot   Normal strength    Additional Strength Details  Abdominals 4-/5    Ambulation     Observational Gait   Gait: antalgic   Decreased walking speed, stride length and left stance time                   Precautions h/o disc herniation, h/o of casted knee (fell on knee), arthritis, DM, gastrointestinal disease, HTN       Manual STM        piriformis str        ITB stretch                            Exercise Diary  1/9       bike        SKTC 10" 5x ea       PPT with hold 5" 10x       TA hip add iso        TA hip abd iso        TA marches        SAQ        LAQ 5" 10x ea               Sit to stands        Side stepping                                                                                    Modalities        prn 05-Feb-2021 23:23

## 2021-09-28 ENCOUNTER — APPOINTMENT (OUTPATIENT)
Dept: LAB | Facility: MEDICAL CENTER | Age: 66
End: 2021-09-28
Payer: COMMERCIAL

## 2022-03-08 ENCOUNTER — APPOINTMENT (OUTPATIENT)
Dept: LAB | Facility: MEDICAL CENTER | Age: 67
End: 2022-03-08
Payer: COMMERCIAL

## 2022-03-08 DIAGNOSIS — E11.9 TYPE 2 DIABETES MELLITUS WITHOUT COMPLICATION, WITH LONG-TERM CURRENT USE OF INSULIN (HCC): ICD-10-CM

## 2022-03-08 DIAGNOSIS — Z79.4 TYPE 2 DIABETES MELLITUS WITHOUT COMPLICATION, WITH LONG-TERM CURRENT USE OF INSULIN (HCC): ICD-10-CM

## 2022-03-08 LAB
ANION GAP SERPL CALCULATED.3IONS-SCNC: 5 MMOL/L (ref 4–13)
BUN SERPL-MCNC: 22 MG/DL (ref 5–25)
CALCIUM SERPL-MCNC: 10 MG/DL (ref 8.3–10.1)
CHLORIDE SERPL-SCNC: 106 MMOL/L (ref 100–108)
CHOLEST SERPL-MCNC: 115 MG/DL
CO2 SERPL-SCNC: 26 MMOL/L (ref 21–32)
CREAT SERPL-MCNC: 0.97 MG/DL (ref 0.6–1.3)
EST. AVERAGE GLUCOSE BLD GHB EST-MCNC: 137 MG/DL
GFR SERPL CREATININE-BSD FRML MDRD: 61 ML/MIN/1.73SQ M
GLUCOSE P FAST SERPL-MCNC: 94 MG/DL (ref 65–99)
HBA1C MFR BLD: 6.4 %
HDLC SERPL-MCNC: 46 MG/DL
LDLC SERPL CALC-MCNC: 55 MG/DL (ref 0–100)
NONHDLC SERPL-MCNC: 69 MG/DL
POTASSIUM SERPL-SCNC: 4.5 MMOL/L (ref 3.5–5.3)
SODIUM SERPL-SCNC: 137 MMOL/L (ref 136–145)
TRIGL SERPL-MCNC: 69 MG/DL

## 2022-03-08 PROCEDURE — 80061 LIPID PANEL: CPT

## 2022-03-08 PROCEDURE — 36415 COLL VENOUS BLD VENIPUNCTURE: CPT

## 2022-03-08 PROCEDURE — 80048 BASIC METABOLIC PNL TOTAL CA: CPT

## 2022-03-08 PROCEDURE — 83036 HEMOGLOBIN GLYCOSYLATED A1C: CPT

## 2022-09-12 ENCOUNTER — APPOINTMENT (OUTPATIENT)
Dept: LAB | Facility: MEDICAL CENTER | Age: 67
End: 2022-09-12
Payer: COMMERCIAL

## 2022-09-12 DIAGNOSIS — Z79.4 TYPE 2 DIABETES MELLITUS WITHOUT COMPLICATION, WITH LONG-TERM CURRENT USE OF INSULIN (HCC): ICD-10-CM

## 2022-09-12 DIAGNOSIS — Z79.4 ENCOUNTER FOR LONG-TERM (CURRENT) USE OF INSULIN (HCC): ICD-10-CM

## 2022-09-12 DIAGNOSIS — E11.9 TYPE 2 DIABETES MELLITUS WITHOUT COMPLICATION, WITH LONG-TERM CURRENT USE OF INSULIN (HCC): ICD-10-CM

## 2022-09-12 LAB
ANION GAP SERPL CALCULATED.3IONS-SCNC: 3 MMOL/L (ref 4–13)
BUN SERPL-MCNC: 22 MG/DL (ref 5–25)
CALCIUM SERPL-MCNC: 9.3 MG/DL (ref 8.3–10.1)
CHLORIDE SERPL-SCNC: 109 MMOL/L (ref 96–108)
CO2 SERPL-SCNC: 25 MMOL/L (ref 21–32)
CREAT SERPL-MCNC: 0.99 MG/DL (ref 0.6–1.3)
EST. AVERAGE GLUCOSE BLD GHB EST-MCNC: 140 MG/DL
GFR SERPL CREATININE-BSD FRML MDRD: 59 ML/MIN/1.73SQ M
GLUCOSE P FAST SERPL-MCNC: 84 MG/DL (ref 65–99)
HBA1C MFR BLD: 6.5 %
POTASSIUM SERPL-SCNC: 4.6 MMOL/L (ref 3.5–5.3)
SODIUM SERPL-SCNC: 137 MMOL/L (ref 135–147)

## 2022-09-12 PROCEDURE — 83036 HEMOGLOBIN GLYCOSYLATED A1C: CPT

## 2022-09-12 PROCEDURE — 36415 COLL VENOUS BLD VENIPUNCTURE: CPT

## 2022-09-12 PROCEDURE — 80048 BASIC METABOLIC PNL TOTAL CA: CPT

## 2022-09-30 ENCOUNTER — APPOINTMENT (OUTPATIENT)
Dept: LAB | Facility: MEDICAL CENTER | Age: 67
End: 2022-09-30
Payer: COMMERCIAL

## 2022-09-30 DIAGNOSIS — R53.82 CHRONIC FATIGUE: ICD-10-CM

## 2022-09-30 LAB
BASOPHILS # BLD AUTO: 0.09 THOUSANDS/ΜL (ref 0–0.1)
BASOPHILS NFR BLD AUTO: 1 % (ref 0–1)
EOSINOPHIL # BLD AUTO: 0.56 THOUSAND/ΜL (ref 0–0.61)
EOSINOPHIL NFR BLD AUTO: 5 % (ref 0–6)
ERYTHROCYTE [DISTWIDTH] IN BLOOD BY AUTOMATED COUNT: 13.2 % (ref 11.6–15.1)
HCT VFR BLD AUTO: 39.7 % (ref 34.8–46.1)
HGB BLD-MCNC: 12.6 G/DL (ref 11.5–15.4)
IMM GRANULOCYTES # BLD AUTO: 0.05 THOUSAND/UL (ref 0–0.2)
IMM GRANULOCYTES NFR BLD AUTO: 0 % (ref 0–2)
LYMPHOCYTES # BLD AUTO: 3.05 THOUSANDS/ΜL (ref 0.6–4.47)
LYMPHOCYTES NFR BLD AUTO: 27 % (ref 14–44)
MCH RBC QN AUTO: 30.4 PG (ref 26.8–34.3)
MCHC RBC AUTO-ENTMCNC: 31.7 G/DL (ref 31.4–37.4)
MCV RBC AUTO: 96 FL (ref 82–98)
MONOCYTES # BLD AUTO: 0.8 THOUSAND/ΜL (ref 0.17–1.22)
MONOCYTES NFR BLD AUTO: 7 % (ref 4–12)
NEUTROPHILS # BLD AUTO: 6.69 THOUSANDS/ΜL (ref 1.85–7.62)
NEUTS SEG NFR BLD AUTO: 60 % (ref 43–75)
NRBC BLD AUTO-RTO: 0 /100 WBCS
PLATELET # BLD AUTO: 355 THOUSANDS/UL (ref 149–390)
PMV BLD AUTO: 9.5 FL (ref 8.9–12.7)
RBC # BLD AUTO: 4.14 MILLION/UL (ref 3.81–5.12)
TSH SERPL DL<=0.05 MIU/L-ACNC: 3.71 UIU/ML (ref 0.45–4.5)
WBC # BLD AUTO: 11.24 THOUSAND/UL (ref 4.31–10.16)

## 2022-09-30 PROCEDURE — 85025 COMPLETE CBC W/AUTO DIFF WBC: CPT

## 2022-09-30 PROCEDURE — 84443 ASSAY THYROID STIM HORMONE: CPT

## 2022-09-30 PROCEDURE — 36415 COLL VENOUS BLD VENIPUNCTURE: CPT

## 2022-10-24 ENCOUNTER — APPOINTMENT (OUTPATIENT)
Dept: LAB | Facility: MEDICAL CENTER | Age: 67
End: 2022-10-24
Payer: COMMERCIAL

## 2022-10-24 DIAGNOSIS — D72.829 LEUKOCYTOSIS, UNSPECIFIED TYPE: ICD-10-CM

## 2022-10-24 LAB
BASOPHILS # BLD AUTO: 0.08 THOUSANDS/ÂΜL (ref 0–0.1)
BASOPHILS NFR BLD AUTO: 1 % (ref 0–1)
EOSINOPHIL # BLD AUTO: 0.54 THOUSAND/ÂΜL (ref 0–0.61)
EOSINOPHIL NFR BLD AUTO: 5 % (ref 0–6)
ERYTHROCYTE [DISTWIDTH] IN BLOOD BY AUTOMATED COUNT: 13.2 % (ref 11.6–15.1)
HCT VFR BLD AUTO: 38.1 % (ref 34.8–46.1)
HGB BLD-MCNC: 12.2 G/DL (ref 11.5–15.4)
IMM GRANULOCYTES # BLD AUTO: 0.06 THOUSAND/UL (ref 0–0.2)
IMM GRANULOCYTES NFR BLD AUTO: 1 % (ref 0–2)
LYMPHOCYTES # BLD AUTO: 2.57 THOUSANDS/ÂΜL (ref 0.6–4.47)
LYMPHOCYTES NFR BLD AUTO: 25 % (ref 14–44)
MCH RBC QN AUTO: 30.8 PG (ref 26.8–34.3)
MCHC RBC AUTO-ENTMCNC: 32 G/DL (ref 31.4–37.4)
MCV RBC AUTO: 96 FL (ref 82–98)
MONOCYTES # BLD AUTO: 0.64 THOUSAND/ÂΜL (ref 0.17–1.22)
MONOCYTES NFR BLD AUTO: 6 % (ref 4–12)
NEUTROPHILS # BLD AUTO: 6.54 THOUSANDS/ÂΜL (ref 1.85–7.62)
NEUTS SEG NFR BLD AUTO: 62 % (ref 43–75)
NRBC BLD AUTO-RTO: 0 /100 WBCS
PLATELET # BLD AUTO: 304 THOUSANDS/UL (ref 149–390)
PMV BLD AUTO: 9.8 FL (ref 8.9–12.7)
RBC # BLD AUTO: 3.96 MILLION/UL (ref 3.81–5.12)
WBC # BLD AUTO: 10.43 THOUSAND/UL (ref 4.31–10.16)

## 2022-10-24 PROCEDURE — 36415 COLL VENOUS BLD VENIPUNCTURE: CPT

## 2022-10-24 PROCEDURE — 85025 COMPLETE CBC W/AUTO DIFF WBC: CPT

## 2023-05-04 ENCOUNTER — APPOINTMENT (OUTPATIENT)
Dept: LAB | Facility: MEDICAL CENTER | Age: 68
End: 2023-05-04

## 2023-05-04 DIAGNOSIS — E78.2 MIXED HYPERLIPIDEMIA: ICD-10-CM

## 2023-05-04 DIAGNOSIS — E11.65 TYPE 2 DIABETES MELLITUS WITH HYPERGLYCEMIA, WITHOUT LONG-TERM CURRENT USE OF INSULIN (HCC): ICD-10-CM

## 2023-05-04 LAB
CHOLEST SERPL-MCNC: 133 MG/DL
HDLC SERPL-MCNC: 45 MG/DL
LDLC SERPL CALC-MCNC: 68 MG/DL (ref 0–100)
NONHDLC SERPL-MCNC: 88 MG/DL
TRIGL SERPL-MCNC: 102 MG/DL